# Patient Record
Sex: FEMALE | Race: WHITE | HISPANIC OR LATINO | URBAN - NONMETROPOLITAN AREA
[De-identification: names, ages, dates, MRNs, and addresses within clinical notes are randomized per-mention and may not be internally consistent; named-entity substitution may affect disease eponyms.]

---

## 2019-03-08 ENCOUNTER — OFFICE VISIT (OUTPATIENT)
Dept: URBAN - NONMETROPOLITAN AREA CLINIC 10 | Facility: CLINIC | Age: 34
End: 2019-03-08
Payer: COMMERCIAL

## 2019-03-08 PROCEDURE — 92015 DETERMINE REFRACTIVE STATE: CPT | Performed by: OPTOMETRIST

## 2019-03-08 PROCEDURE — 92014 COMPRE OPH EXAM EST PT 1/>: CPT | Performed by: OPTOMETRIST

## 2019-03-08 ASSESSMENT — INTRAOCULAR PRESSURE
OD: 17
OS: 18

## 2019-03-08 ASSESSMENT — VISUAL ACUITY
OD: 20/20
OS: 20/20

## 2019-03-08 NOTE — IMPRESSION/PLAN
Impression: Myopia, bilateral: H52.13. Plan: Give Rx for glass and instructed on normal adaptation period. OK to use while driving only.

## 2020-10-02 ENCOUNTER — OFFICE VISIT (OUTPATIENT)
Dept: URBAN - NONMETROPOLITAN AREA CLINIC 10 | Facility: CLINIC | Age: 35
End: 2020-10-02
Payer: COMMERCIAL

## 2020-10-02 DIAGNOSIS — H52.13 MYOPIA, BILATERAL: Primary | ICD-10-CM

## 2020-10-02 PROCEDURE — 92014 COMPRE OPH EXAM EST PT 1/>: CPT | Performed by: OPTOMETRIST

## 2020-10-02 ASSESSMENT — VISUAL ACUITY
OD: 20/20
OS: 20/25

## 2020-10-02 ASSESSMENT — INTRAOCULAR PRESSURE
OS: 17
OD: 17

## 2023-10-19 ENCOUNTER — OFFICE VISIT (OUTPATIENT)
Dept: URBAN - NONMETROPOLITAN AREA CLINIC 10 | Facility: CLINIC | Age: 38
End: 2023-10-19
Payer: COMMERCIAL

## 2023-10-19 DIAGNOSIS — H52.13 MYOPIA, BILATERAL: Primary | ICD-10-CM

## 2023-10-19 PROCEDURE — 92004 COMPRE OPH EXAM NEW PT 1/>: CPT | Performed by: STUDENT IN AN ORGANIZED HEALTH CARE EDUCATION/TRAINING PROGRAM

## 2023-10-19 ASSESSMENT — INTRAOCULAR PRESSURE
OD: 20
OS: 21

## 2023-10-19 ASSESSMENT — VISUAL ACUITY
OD: 20/20
OS: 20/20